# Patient Record
Sex: MALE | Race: WHITE
[De-identification: names, ages, dates, MRNs, and addresses within clinical notes are randomized per-mention and may not be internally consistent; named-entity substitution may affect disease eponyms.]

---

## 2017-03-16 ENCOUNTER — HOSPITAL ENCOUNTER (EMERGENCY)
Dept: HOSPITAL 75 - ER | Age: 37
Discharge: HOME | End: 2017-03-16
Payer: COMMERCIAL

## 2017-03-16 VITALS — DIASTOLIC BLOOD PRESSURE: 91 MMHG | SYSTOLIC BLOOD PRESSURE: 145 MMHG

## 2017-03-16 VITALS — WEIGHT: 245 LBS | HEIGHT: 75 IN | BODY MASS INDEX: 30.46 KG/M2

## 2017-03-16 DIAGNOSIS — S43.015A: Primary | ICD-10-CM
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
## 2017-03-16 DIAGNOSIS — Z79.899: ICD-10-CM
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
## 2017-03-16 PROCEDURE — 73030 X-RAY EXAM OF SHOULDER: CPT

## 2017-03-16 PROCEDURE — 96376 TX/PRO/DX INJ SAME DRUG ADON: CPT

## 2017-03-16 PROCEDURE — 96375 TX/PRO/DX INJ NEW DRUG ADDON: CPT

## 2017-03-16 PROCEDURE — 96374 THER/PROPH/DIAG INJ IV PUSH: CPT

## 2017-03-16 PROCEDURE — 23655 CLTX SHO DSLC W/MNPJ W/ANES: CPT

## 2017-03-16 PROCEDURE — 73020 X-RAY EXAM OF SHOULDER: CPT

## 2017-03-16 PROCEDURE — 93041 RHYTHM ECG TRACING: CPT

## 2017-03-16 PROCEDURE — 73110 X-RAY EXAM OF WRIST: CPT

## 2017-03-16 RX ADMIN — FENTANYL CITRATE PRN MCG: 50 INJECTION, SOLUTION INTRAMUSCULAR; INTRAVENOUS at 11:13

## 2017-03-16 RX ADMIN — FENTANYL CITRATE PRN MCG: 50 INJECTION, SOLUTION INTRAMUSCULAR; INTRAVENOUS at 08:53

## 2017-03-16 NOTE — DIAGNOSTIC IMAGING REPORT
INDICATION: Tripped on a rope, landed on left outstretched arm

at work. Left wrist pain.



FINDINGS: 3 images of the left wrist demonstrate normal

ossification. No fracture or dislocation is present.



IMPRESSION: Normal left wrist.



Dictated by:



Dictated on workstation # [id number]

## 2017-03-16 NOTE — DIAGNOSTIC IMAGING REPORT
Clinical indication: Patient complains of left shoulder pain.

Patient unable to move arm and having severe pain.



Exam: Portable x-ray left shoulder.



Comparison: None.



Findings:

There is anterior dislocation of the left shoulder seen with

anterior and inferior position of the left humeral head in

relation to the glenoid. There is no gross fracture seen.



Impression:

1.: Left shoulder anterior dislocation. There is no gross

fracture seen. Followup x-ray imaging post reduction is

suggested.



Dictated by:



Dictated on workstation # [id number]

## 2017-03-16 NOTE — DIAGNOSTIC IMAGING REPORT
INDICATION: Postreduction of left shoulder.



FINDINGS: 2 views of the left shoulder demonstrate interval

reduction of the anterior dislocation. There is questionable

bone fragment posterior to the region of the humeral neck.



IMPRESSION: Interval reduction of the shoulder dislocation.

There is an osseous fragment overlying the humeral neck,

possible fracture.



CRITICAL FINDING.



Report was called to Dr. Pereyra in ER @ [facility], [place] @ 11:52 AM/fozia.



Dictated by:



Dictated on workstation # [id number]

## 2017-03-16 NOTE — DIAGNOSTIC IMAGING REPORT
CLINICAL INDICATION: Patient with dislocated left shoulder.



EXAM: Portable x-ray of the left shoulder, upright view.



COMPARISON: X-ray of the left shoulder dated 3/16/2017.



FINDINGS:

Again seen dislocation of the left glenohumeral joint with the

proximal left humeral head subluxed inferiorly. There is slight

flattened appearance of the posterior lateral aspect of the

proximal left femoral head which may represent Hill-Sachs

deformity.



IMPRESSION:

1. Persistent dislocation of the left shoulder.



2. Slight flattened appearance of the proximal left humeral head

which may be related to Hill-Sachs deformity. Followup x-ray

imaging postreduction of the left shoulder would help better

evaluate.



Dictated by:



Dictated on workstation # [id number]

## 2017-03-16 NOTE — ED FALL/INJURY
General


Chief Complaint:  Trauma-Non Activation


Stated Complaint:  FALL,L SHOULDER PAIN


Nursing Triage Note:  


patient reports was walking outside of work and tripped on rope and landed on L 


outstreched arm. Patient reports L shoulder pain


Source:  patient


Exam Limitations:  no limitations


 (VINITA JARAMILLO MD)





History of Present Illness


Time seen by provider:  05:25


Initial Comments


Here by EMS with report of left shoulder pain.  Patient was apparently closing 

a receiving door at [facility] and was walking backwards with the rope as he was 

closing the door and tripped over the rope.  He fell and hit his left shoulder 

on metal shelving and then caught himself with his left hand.  He immediately 

had severe left shoulder pain.  Denies hitting his head.  Denies loss of 

consciousness.  He does report hitting his left knee but states that that is no 

problem at all and is not concerned about it.  He has never fractured or 

dislocated his shoulder previously.


Occurred:  just prior to arrival


Severity:  moderate


Injuries/Pain Location:  upper extremity


Context:  tripped


Loss of Consciousness:  no loss of consciousness


Modifying Factors:  Worse With Movement


Associated Symptoms (Fall):  No Abdominal Pain, No Chest Pain, No Nausea/

Vomiting (VINITA JARAMILLO MD)





Allergies and Home Medications


Allergies


Coded Allergies:  


     iodine (Verified  Allergy, Unknown, 3/16/17)





Home Medications


Bisoprolol Fumarate/Hctz 1 Each Tablet, 1 EACH PO DAILY, (Reported)


Fluticasone Propionate 9.9 Ml Spray.susp, 9.9 ML NS, (Reported)


Guaifenesin 600 Mg Tab.er.12h, 600 MG PO, (Reported)


Hydrochlorothiazide 25 Mg Tablet, 25 MG PO, (Reported)


Hydrocodone/Acetaminophen 1 Each Tablet, 1-2 EACH PO Q4H PRN for PAIN, #30


   Prescribed by: MARLYS HATCH on 3/16/17 1131


Lisinopril 10 Mg Tablet, 10 MG PO DAILY, (Reported)





Constitutional:  see HPI, No chills, No fever


Eyes:  No Symptoms Reported


Ears, Nose, Mouth, Throat:  no symptoms reported


Respiratory:  no symptoms reported


Cardiovascular:  no symptoms reported


Gastrointestinal:  no symptoms reported


Musculoskeletal:  see HPI, joint pain, joint swelling, muscle pain


Skin:  no symptoms reported (VINITA JARAMILLO MD)





All Other Systems Reviewed


Negative Unless Noted:  Yes


 (VINITA JARAMILLO MD)





Past Medical-Social-Family Hx


Patient Social History


Alcohol Use:  Rarely Uses


Recreational Drug Use:  No


Smoking Status:  Never a Smoker


Recent Foreign Travel:  No


Contact w/Someone Who Travel:  No


Recent Infectious Disease Expo:  No


Recent Hopitalizations:  No


 (VINITA JARAMILLO MD)





Seasonal Allergies


Seasonal Allergies:  No


 (VINITA JARAMILLO MD)





Surgeries


HX Surgeries:  Yes (lithotripsy)


 (VINITA JARAMILLO MD)





Respiratory


Hx Respiratory Disorders:  No


 (IVNITA JARAMILLO MD)





Cardiovascular


Hx Cardiac Disorders:  Yes


Cardiac Disorders:  Hypertension


 (VINITA JARAMILLO MD)





Neurological


Hx Neurological Disorders:  No


 (VINITA JARAMILLO MD)





Genitourinary


Hx Genitourinary Disorders:  Yes


Genitourinary Disorders:  Kidney Stones


 (VINITA JARAMILLO MD)





Gastrointestinal


Hx Gastrointestinal Disorders:  No


 (VINITA JARAMILLO MD)





Musculoskeletal


Hx Musculoskeletal Disorders:  No


 (VINITA JARAMILLO MD)





Endocrine


Hx Endocrine Disorders:  No


 (VINITA JARAMILLO MD)





HEENT


HX ENT Disorders:  No


 (VINITA JARAMILLO MD)





Cancer


Hx Cancer:  No


 (VINITA JARAMILLO MD)





Psychosocial


Hx Psychiatric Problems:  No


 (VINITA JARAMILLO MD)





Integumentary


HX Skin/Integumentary Disorder:  No


 (VINITA JARAMILLO MD)





Blood Transfusions


Hx Blood Disorders:  No


 (VINITA JARAMILLO MD)





Reviewed Nursing Assessment


Reviewed/Agree w Nursing PMH:  Yes


 (VINITA JARAMILLO MD)





Family Medical History


Significant Family History:  No Pertinent Family Hx


 (VINITA JARAMILLO MD)





Physical Exam


Vital Signs





Vital Sign - Last 12Hours








 3/16/17 3/16/17 3/16/17





 05:28 08:44 11:20


 


Temp 98.4  


 


Pulse 71  


 


Resp 18  


 


B/P (MAP) 146/73  


 


Pulse Ox 98  


 


O2 Delivery   Nasal Cannula


 


O2 Flow Rate  2 





 (MARLYS PEREYRA MD)


Vital Signs


Capillary Refill : Less Than 3 Seconds 


 (VINITA JARAMILLO MD)


General Appearance:  WD/WN, moderate distress (left shoulder pain)


HEENT:  PERRL/EOMI, pharynx normal


Neck:  non-tender, full range of motion, supple


Cardiovascular:  regular rate, rhythm, no murmur


Respiratory:  lungs clear, normal breath sounds


Gastrointestinal:  non tender, soft


Back:  normal inspection, no CVA tenderness, no vertebral tenderness


Extremities:  other (left shoulder with moderate to significant tenderness and 

deformity with the appearance of dislocation.  No pain in the elbow, wrist or 

hand.  Full range of motion of the hand but states it hurts at the area of the 

shoulder.  Full range of motion of both legs without pain.  Nontender to 

bilateral knees.)


Neurologic/Psychiatric:  no motor/sensory deficits, alert, oriented x 3, other (

distal sensation and circulation to the left hand intact)


Skin:  normal color, warm/dry (VINITA JARAMILLO MD)





Wickett Coma Score


Best Eye Response:  (4) Open Spontaneously


Best Verbal Response:  (5) Oriented


Best Motor Response:  (6) Obeys Commands


 (VINITA JARAMILLO MD)





Splinting and Joint Reduction #1:  


   Pre-Proc Neuro Vasc Exam:  normal


   Post-Proc Neuro Vasc Exam:  normal


   Joint Reduction Site:  shoulder (L)


   Reduction Attempts:  3


   Pre-Procedure NV Exam:  Yes


   post joint reduction film:  joint not reduced


Progress


In the initial reduction attempt was performed under sedation with fentanyl and 

Versed.  Multiple attempts were made at the Legg maneuver with patient in a 

seated position.  These attempts were unsuccessful.  Patient was then placed in 

bed for another attempt.


Splinting and Joint Reduction #2:  


   Pre-Proc Neuro Vasc Exam:  normal


   Post-Proc Neuro Vasc Exam:  normal


   Joint Reduction Site:  shoulder (L)


   Reduction Attempts:  3


   Pre-Procedure NV Exam:  Yes


   post joint reduction film:  joint reduced


Progress


Patient was again sedated with fentanyl and Versed.  2 attempts at reduction 

with traction and countertraction were not successful.  A third attempt 

accompanied by gentle pressure on the anterior humeral head by nursing staff 

resulted in reduction.  Patient was placed in a shoulder immobilizer.


 (MARLYS PEREYRA MD)





Progress/Results/Core Measures


Results/Orders


My Orders





Orders - MARLYS PEREYRA MD


Hydromorphone Injection (Dilaudid Inject (3/16/17 06:16)


Hydromorphone Injection (Dilaudid Inject (3/16/17 06:52)


Fentanyl  Injection (Sublimaze Injection (3/16/17 07:30)


Midazolam Injection (Versed Injection) (3/16/17 07:30)


Wrist, Left, 3 Views Or More (3/16/17 07:28)


Fentanyl  Injection (Sublimaze Injection (3/16/17 09:00)


Fentanyl  Injection (Sublimaze Injection (3/16/17 09:30)


Midazolam Injection (Versed Injection) (3/16/17 09:30)


Shoulder, Left, 1 View (3/16/17 08:57)


Midazolam Injection (Versed Injection) (3/16/17 11:15)


Shoulder, Left, 2 Views (3/16/17 11:20)


Midazolam Injection (Versed Injection) (3/16/17 11:45)


Iv Push Med Admin Ed (3/16/17 )


 (MARLYS PEREYRA MD)


Medications Given in ED


 (MARLYS PEREYRA MD)


Vital Signs/I&O





Vital Sign - Last 12Hours








 3/16/17 3/16/17 3/16/17 3/16/17





 05:28 08:44 11:20 12:09


 


Temp 98.4   


 


Pulse 71   77


 


Resp 18   16


 


B/P (MAP) 146/73   


 


Pulse Ox 98   98


 


O2 Delivery   Nasal Cannula 


 


O2 Flow Rate  2 100 





   2 





 (MARLYS PEREYRA MD)








Blood Pressure Mean:  97








Progress Note :  


Progress Note


Seen and evaluated.  IV and left shoulder x-ray ordered.  Fentanyl 75 g IV.  

0625: Care transferred to Dr. Hatch.  Pending x-ray.


 (VINITA JARAMILLO MD)


Progress Note :  


Progress Note


Care of this patient was assumed from Dr. Jaramillo.  There were multiple 

attempts made at reduction of the shoulder.  Reduction was unsuccessful during 

the first round of sedation.  Reduction was successful during the second round.

  On postreduction films there appeared to be a small bony fragment fractured 

off the rim of the left shoulder acetabulum.  This was discussed with Dr. Fernandez.


 (MARLYS PEREYRA MD)





Diagnostic Imaging





   Diagonstic Imaging:  Xray


   Plain Films/CT/US/NM/MRI:  other (left shoulder)


Comments


Left shoulder x-ray viewed by me and report reviewed.  See report below:





NAME:   DEISY CHOW


MED REC#:   [id number]


ACCOUNT#:   [id number]


PT STATUS:   REG ER


:   1980


PHYSICIAN:   VINITA JARAMILLO MD


ADMIT DATE:   17/ER


   ***Draft***


Date of Exam:17





SHOULDER, RIGHT, 3 VIEWS





Clinical indication: Patient complains of left shoulder pain.


Patient unable to move arm and having severe pain.





Exam: Portable x-ray left shoulder.





Comparison: None.





Findings:


There is anterior dislocation of the left shoulder seen with


anterior and inferior position of the left humeral head in


relation to the glenoid. There is no gross fracture seen.





Impression:


1.: Left shoulder anterior dislocation. There is no gross


fracture seen. Followup x-ray imaging post reduction is


suggested.





Dictated on workstation # [id number]





Dict:   17 0656


Trans:   17 0703


CHARLY [id number]





Interpreted by:     RAMILA MONTOYA MD








   Diagonstic Imaging:  Xray


   Plain Films/CT/US/NM/MRI:  other (left shoulder)


Comments


shoulder x-ray repeated after first round of reduction attempts.  X-ray viewed 

by me and report reviewed.  See report below:





NAME:      DEISY CHOW


MED REC#:   [id number]


ACCOUNT#:   [id number]


PT STATUS:   REG ER


:      1980


PHYSICIAN:    MARLYS PEREYRA MD


ADMIT DATE:   17/ER


***Signed***


Date of Exam:   17





SHOULDER, LEFT, 1 VIEW


 


CLINICAL INDICATION: Patient with dislocated left shoulder.





EXAM: Portable x-ray of the left shoulder, upright view.





COMPARISON: X-ray of the left shoulder dated 3/16/2017.





FINDINGS:


Again seen dislocation of the left glenohumeral joint with the


proximal left humeral head subluxed inferiorly. There is slight


flattened appearance of the posterior lateral aspect of the


proximal left femoral head which may represent Hill-Sachs


deformity.





IMPRESSION:


1. Persistent dislocation of the left shoulder.





2. Slight flattened appearance of the proximal left humeral head


which may be related to Hill-Sachs deformity. Followup x-ray


imaging postreduction of the left shoulder would help better


evaluate.





Dictated by:





Dictated on workstation # [id number]





Dict:   17 0923


Trans:   17 1158


FOZIA  [id number]





Interpreted by:       RAMILA MONTOYA MD


Electronically signed by:RAMILA MONTOYA MD   17 1200








   Diagonstic Imaging:  Xray


   Plain Films/CT/US/NM/MRI:  other (Left shoulder)


Comments


Postreduction film of left shoulder viewed by me and report reviewed.  See 

report below:





NAME:      DEISY CHOW


MED REC#:   [id number]


ACCOUNT#:   [id number]


PT STATUS:   REG ER


:      1980


PHYSICIAN:    MARLYS PEREYRA MD


ADMIT DATE:   17/ER


***Signed***


Date of Exam:   17





SHOULDER, LEFT, 2 VIEWS


 


INDICATION: Postreduction of left shoulder.





FINDINGS: 2 views of the left shoulder demonstrate interval


reduction of the anterior dislocation. There is questionable


bone fragment posterior to the region of the humeral neck.





IMPRESSION: Interval reduction of the shoulder dislocation.


There is an osseous fragment overlying the humeral neck,


possible fracture.





CRITICAL FINDING.





Report was called to Dr. Pereyra in ER @ [place], [place] @ 11:52 AM/fozia.





Dictated by:





Dictated on workstation # [id number]





Dict:   17 1139


Trans:   17 1155


  [id number]





Interpreted by:       ISRAEL MILES MD


Electronically signed by:ISRAEL MILES MD   17 1158








   Diagonstic Imaging:  Xray


   Plain Films/CT/US/NM/MRI:  other (Left wrist)


Comments


Left wrist x-ray viewed by me and report reviewed.  See report below:





NAME:      DEISY CHOW


MED REC#:   [id number]


ACCOUNT#:   [id number]


PT STATUS:   REG ER


:      1980


PHYSICIAN:    MARLYS PEREYRA MD


ADMIT DATE:   17/ER


***Signed***


Date of Exam:   17





WRIST, LEFT, 3 VIEWS OR MORE


 


INDICATION: Tripped on a rope, landed on left outstretched arm


at work. Left wrist pain.





FINDINGS: 3 images of the left wrist demonstrate normal


ossification. No fracture or dislocation is present.





IMPRESSION: Normal left wrist.





Dictated by:





Dictated on workstation # [id number]





Dict:   17 0844


Trans:   17 0849


  [id number]





Interpreted by:       ISRAEL MILES MD


Electronically signed by:ISRAEL MILES MD   17 0852


 (MARLYS PEREYRA MD)





Departure


Impression


Impression:  


 Primary Impression:  


 Dislocation of left shoulder joint


 


 Additional Impressions:  


 Fall from standing


 


 Left wrist pain


 Shoulder fracture


 Qualified Codes:  S42.92XA - Fracture of left shoulder girdle, part unspecified

, initial encounter for closed fracture


Disposition:   HOME, SELF-CARE


Condition:  Improved





Departure-Patient Inst.


Decision time for Depature:  11:15


 (MARLYS PEREYRA MD)


Patient Instructions:  Shoulder Dislocation





Add. Discharge Instructions:  


Keep your left arm in the shoulder immobilizer is much as possible.  You may 

remove the immobilizer if necessary for bathing and dressing but keep the arm 

in the same position.  Do not reach or pull as this may cause recurrent 

dislocation.  Follow-up with an orthopedic surgeon as soon as possible.  You 

should check with your work comp provider to determine if a particular referral 

as necessary.  You may take ibuprofen up to 800 mg every 8 hours as needed for 

pain.  Add hydrocodone as prescribed for pain not controlled by ibuprofen.











All discharge instructions reviewed with patient and/or family. Voiced 

understanding.


Scripts


Hydrocodone/Acetaminophen (Hydrocodon -Acetaminophen 5-325) 1 Each Tablet


1-2 EACH PO Q4H Y for PAIN, #30 TAB


   Prov: MARLYS PEREYRA MD         3/16/17











VINITA JARAMILLO MD Mar 16, 2017 05:40


MARLYS PEREYRA MD Mar 16, 2017 07:22

## 2018-04-02 ENCOUNTER — HOSPITAL ENCOUNTER (EMERGENCY)
Dept: HOSPITAL 75 - ER | Age: 38
Discharge: HOME | End: 2018-04-02
Payer: COMMERCIAL

## 2018-04-02 VITALS — BODY MASS INDEX: 30.46 KG/M2 | WEIGHT: 245 LBS | HEIGHT: 75 IN

## 2018-04-02 VITALS — DIASTOLIC BLOOD PRESSURE: 105 MMHG | SYSTOLIC BLOOD PRESSURE: 128 MMHG

## 2018-04-02 DIAGNOSIS — Z91.041: ICD-10-CM
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
## 2018-04-02 DIAGNOSIS — S61.012A: Primary | ICD-10-CM

## 2018-04-02 DIAGNOSIS — I10: ICD-10-CM
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
## 2018-04-02 DIAGNOSIS — Z87.442: ICD-10-CM

## 2018-04-02 DIAGNOSIS — Z23: ICD-10-CM

## 2018-04-02 PROCEDURE — 96372 THER/PROPH/DIAG INJ SC/IM: CPT

## 2018-04-02 PROCEDURE — 12002 RPR S/N/AX/GEN/TRNK2.6-7.5CM: CPT

## 2018-04-02 PROCEDURE — 90471 IMMUNIZATION ADMIN: CPT

## 2018-04-02 PROCEDURE — 90715 TDAP VACCINE 7 YRS/> IM: CPT

## 2018-04-02 NOTE — ED INTEGUMENTARY GENERAL
General


Chief Complaint:  Laceration


Stated Complaint:  L HAND LAC


Nursing Triage Note:  


PT HAS APPROX 5 CM LAC TO L HAND/THUMB FROM BOX KNIFE AT WORK.


Source:  patient


Exam Limitations:  no limitations





History of Present Illness


Date Seen by Provider:  Apr 2, 2018


Time Seen by Provider:  04:50


Initial Comments


Patient presents to the ER by private conveyance with a chief complaint that he 

was at work at [employer] using a  knife in his right hand and lacerated 

the lateral side of his left thumb approximately 5 cm long. He says he is not 

really feeling any pain right now and it's bleeding quite a bit so they had 

rested up with some gauze and he came to the ER. He does not ever last time he 

had a tetanus shot. He says he still has full use of his thumb. He said that it 

grossed him out when he saw his muscle underneath.





Allergies and Home Medications


Allergies


Coded Allergies:  


     iodine (Verified  Allergy, Unknown, 3/16/17)





Home Medications


Bisoprolol Fumarate/Hctz 1 Each Tablet, 1 EACH PO DAILY, (Reported)


Hydrocodone Bit/Acetaminophen 1 Each Tablet, 1-2 EACH PO Q4H PRN for PAIN


   Prescribed by: MARLYS PEACE on 3/16/17 1131


Lisinopril 10 Mg Tablet, 10 MG PO DAILY, (Reported)





Patient Home Medication List


Home Medication List Reviewed:  Yes





Constitutional:  No chills, No diaphoresis


EENTM:  No ear discharge, No ear pain


Respiratory:  No cough, No short of breath


Cardiovascular:  No chest pain, No Hx of Intervention, No palpitations


Gastrointestinal:  No abdominal pain, No constipation, No diarrhea, No nausea, 

No vomiting





Past Medical-Social-Family Hx


Patient Social History


Alcohol Use:  Denies Use


Recreational Drug Use:  No


Smoking Status:  Never a Smoker


2nd Hand Smoke Exposure:  No


Recent Foreign Travel:  No


Contact w/Someone Who Travel:  No


Recent Infectious Disease Expo:  No


Recent Hopitalizations:  No





Immunizations Up To Date


Tetanus Booster (TDap):  More than 5yrs





Seasonal Allergies


Seasonal Allergies:  No





Surgeries


History of Surgeries:  Yes (lithotripsy)





Respiratory


History of Respiratory Disorde:  No





Cardiovascular


History of Cardiac Disorders:  Yes


Cardiac Disorders:  Hypertension





Neurological


History of Neurological Disord:  No





Genitourinary


Genitourinary Disorders:  Kidney Stones





Gastrointestinal


History of Gastrointestinal Di:  No





Musculoskeletal


History of Musculoskeletal Dis:  No





Endocrine


History of Endocrine Disorders:  No





Cancer


History of Cancer:  No





Psychosocial


History of Psychiatric Problem:  No





Integumentary


History of Skin or Integumenta:  No





Blood Transfusions


History of Blood Disorders:  No





Family Medical History


Significant Family History:  No Pertinent Family Hx





Physical Exam


Vital Signs





Vital Signs - First Documented








 4/2/18





 04:54


 


Temp 98.2


 


Pulse 85


 


Resp 16


 


B/P (MAP) 128/105 (113)


 


Pulse Ox 99





Capillary Refill : Less Than 3 Seconds


General Appearance:  WD/WN, no apparent distress


HEENT:  PERRL/EOMI, pharynx normal


Cardiovascular:  normal peripheral pulses, regular rate, rhythm


Respiratory:  no respiratory distress, no accessory muscle use


Extremities:  other (good pulse and distal capillary refill to the laceration 

less than 3 seconds. He has full range of motion of his thumb.)


Neurologic/Psychiatric:  alert, oriented x 3


Skin:  other (5 cm linear laceration through the dermis and subcutaneous.)





Laceration Repair :  


   Wound Location:  Upper Extremities


Other Wound Location


Lateral/dorsal left thumb


   Wound Length (cm):  5


   Wound's Depth, Shape:  sub Q


   Wound Explored:  clean


   Irrigated w/ Saline (ccs):  100


   Betadine Prep?:  No (chlorhexidine soap water)


   Anesthesia:  1% Lidocaine


   Volume Anesthetic (ccs):  9


   Wound Debrided:  minimal


   Suture:  Ethlion


   Suture Size:  2-0


   Number of Sutures:  9


   Layer Closure?:  1


   Sterile Dressing Applied?:  Yes


Progress


Patient was pain-free at the beginning. Cleaned thoroughly with soap water 

chlorhexidine. Wound was infiltrated and a ring block fashion with 1% lidocaine 

with out epinephrine. When the patient was ascertained to be numb and then 

thoroughly explored and cleaned the wound and irrigated with chlorhexidine soap 

water. There is a just over the wound with a steady venous bleed that was 

sealed with a figure of 8 stitch 1. After that ate more simple interrupted 

sutures were placed to reapproximate the skin edges. The wound was hemostatic, 

cleaned thoroughly and the patient tolerated the procedure very well. Sterile 

gloves and sterile drapes were used. A sterile dressing was then applied with 

an compressive dressing per nursing.





Progress/Results/Core Measures


Results/Orders


My Orders





Orders - JACQUES DIAZ


Dipht,Pertkelsi(Acell),Tet Adult (Boostrix (4/2/18 05:15)


Ceftriaxone Injection (Rocephin Injectio (4/2/18 05:45)


Lidocaine 1% Injection (Xylocaine 1% Inj (4/2/18 05:45)





Vital Signs/I&O





Vital Sign - Last 12Hours








 4/2/18





 04:54


 


Temp 98.2


 


Pulse 85


 


Resp 16


 


B/P (MAP) 128/105 (113)


 


Pulse Ox 99














Blood Pressure Mean:  113








Progress Note :  


   Time:  05:51


Progress Note


Tetanus shot and a gram or Rocephin. We'll have him follow up some Keflex 

outpatient.





Departure


Impression


Impression:  


 Primary Impression:  


 Laceration of thumb, left


 Qualified Codes:  S61.012A - Laceration without foreign body of left thumb 

without damage to nail, initial encounter


Disposition:  01 HOME, SELF-CARE


Condition:  Improved





Departure-Patient Inst.


Decision time for Depature:  05:51


Referrals:  


NO,LOCAL PHYSICIAN (PCP/Family)


Primary Care Physician


Patient Instructions:  Laceration Repair With Stitches (DC)





Add. Discharge Instructions:  


Keep the wound clean with regular soap and water. Apply a thin layer of 

Vaseline over the skin edges just to keep the moistened between cleanings. 

Change the dressing it becomes soiled or at least once a day. It's okay to 

shower and allow soap water to run over the wound. If a stitch becomes 

displaced just apply pressure and raise your hand above the level of your heart 

until it stops bleeding. If your wound gets swollen or painful you can apply an 

ice pack for 20 minutes every 2 hours for the first 2-3 days. You can also use 

ibuprofen 800 mg every 8 hours as needed. In addition to that you can use 

Tylenol 1000 mg every 8 hours as needed. Return to care if the wound is getting 

swollen and red or your pain is uncontrollable. Return to the ER to have the 

stitches removed in 7-10 days. 


 the antibiotics and take one capsule 4 times a day for the next 4 days.


All discharge instructions reviewed with patient and/or family. Voiced 

understanding.


Scripts


Hydrocodone Bit/Acetaminophen (Hydrocodone/Acetaminophen 5/325mg Tablet) 1 Tab 

Tab


1 EACH PO Q6H Y for BREAKTHROUGH PAIN, #10 TAB 0 Refills


   Prov: JACQUES DIAZ         4/2/18 


Cephalexin (Cephalexin) 500 Mg Tablet


500 MG PO QIDACHS for 4 Days, #20 TAB 0 Refills


   Prov: JACQUES DIAZ         4/2/18


Work/School Note:  Work Release Form   Date Seen in the Emergency Department:  

Apr 2, 2018


   Return to Work:  Apr 2, 2018


   Restrictions:  Need Release from Doctor


   Other Restrictions Listed Below:  Do not use your thumb on the left hand for 

work until the stitches are out.











JACQUES DIAZ Apr 2, 2018 05:09

## 2018-04-10 ENCOUNTER — HOSPITAL ENCOUNTER (EMERGENCY)
Dept: HOSPITAL 75 - ER | Age: 38
Discharge: HOME | End: 2018-04-10
Payer: COMMERCIAL

## 2018-04-10 VITALS — WEIGHT: 255 LBS | HEIGHT: 75 IN | BODY MASS INDEX: 31.71 KG/M2

## 2018-04-10 VITALS — DIASTOLIC BLOOD PRESSURE: 66 MMHG | SYSTOLIC BLOOD PRESSURE: 104 MMHG

## 2018-04-10 DIAGNOSIS — T81.31XA: Primary | ICD-10-CM

## 2018-04-10 NOTE — ED SUTURE REMOVAL/WOUND CHECK
Suture/Wound Re-check


Suture Removal/Wound Recheck :  


   Suture Removal/Wound Recheck:  Sutures removed by MD, Sutures removed by RN


Progress


Physician was brought to the exam room to assess the wound as sutures were 

being removed.  Wound appeared to be dehiscing upon removal of sutures.  

Patient is also concerned about erythema and some possible purulence at the 

distal end of the wound.  Sutures were removed and Steri-Strips were applied to 

loosely approximate the wound in a nonocclusive fashion.  Antibiotics were 

prescribed.


General Appearance:  WD/WN, no apparent distress





Physical Exam


Vital Signs





Vital Signs - First Documented








 4/10/18 4/10/18





 10:35 10:49


 


Temp  98.5


 


Pulse 68 


 


Resp 18 


 


B/P (MAP) 104/66 


 


Pulse Ox 95 


 


O2 Delivery Room Air 





Capillary Refill :


General Appearance:  WD/WN, no apparent distress


Extremities:  other (wound opened when sutures were removed.  The edges of the 

wound appeared to have not adhered.  The distal portion of the wound had some 

subtle erythema and purulent appearing moisture.  No overt abscess or 

cellulitis.)


Neurologic/Psychiatric:  CNs II-XII nml as tested, no motor/sensory deficits, 

alert





Departure


Impression





 Primary Impression:  


 Visit for suture removal


 Additional Impression:  


 Wound dehiscence


Disposition:  01 HOME, SELF-CARE


Condition:  Improved





Departure-Patient Inst.


Referrals:  


NO,LOCAL PHYSICIAN (PCP/Family)


Primary Care Physician


Patient Instructions:  SUTURE REMOVAL - UNCOMPLICATED





Add. Discharge Instructions:  


Complete your antibiotics as prescribed.  Monitor for signs of worsening 

infection such as pus like drainage, increasing redness, increasing pain, 

increasing swelling, fever, etc.  Return to care promptly if you notice these 

symptoms.  Allow the Steri-Strips to slough off naturally.  Do not attempt to 

peel them off.  You may cut loose edges from the Steri-Strips with small 

scissors or fingernail clippers.  Avoid submersion as much as possible.  You 

may shower with the Steri-Strips on.  Keep covered while active or in dirty 

environments.  Your wound will take several weeks to completely heal.











All discharge instructions reviewed with patient and/or family. Voiced 

understanding.


Scripts


Sulfamethoxazole/Trimethoprim (Bactrim Ds Tablet) 1 Each Tablet


1 EACH PO BID PRN, #14 TAB


   Prov: MARLYS STACK MD         4/10/18











MARLYS STACK MD Apr 10, 2018 10:59